# Patient Record
Sex: FEMALE | Race: WHITE | NOT HISPANIC OR LATINO | Employment: UNEMPLOYED | URBAN - METROPOLITAN AREA
[De-identification: names, ages, dates, MRNs, and addresses within clinical notes are randomized per-mention and may not be internally consistent; named-entity substitution may affect disease eponyms.]

---

## 2019-08-16 ENCOUNTER — HOSPITAL PATHOLOGY (OUTPATIENT)
Dept: OTHER | Facility: CLINIC | Age: 44
End: 2019-08-16

## 2019-08-19 LAB — COPATH REPORT: NORMAL

## 2021-04-09 ENCOUNTER — HOSPITAL ENCOUNTER (OUTPATIENT)
Dept: ULTRASOUND IMAGING | Facility: CLINIC | Age: 46
Discharge: HOME | End: 2021-04-09
Attending: PHYSICIAN ASSISTANT | Admitting: PHYSICIAN ASSISTANT
Payer: COMMERCIAL

## 2021-04-09 DIAGNOSIS — R74.8 ELEVATED ALKALINE PHOSPHATASE LEVEL: ICD-10-CM

## 2021-04-09 PROCEDURE — 76705 ECHO EXAM OF ABDOMEN: CPT

## 2022-01-26 ENCOUNTER — TRANSFERRED RECORDS (OUTPATIENT)
Dept: HEALTH INFORMATION MANAGEMENT | Facility: CLINIC | Age: 47
End: 2022-01-26

## 2022-02-17 ENCOUNTER — TRANSFERRED RECORDS (OUTPATIENT)
Dept: HEALTH INFORMATION MANAGEMENT | Facility: CLINIC | Age: 47
End: 2022-02-17

## 2022-12-22 ENCOUNTER — TRANSFERRED RECORDS (OUTPATIENT)
Dept: HEALTH INFORMATION MANAGEMENT | Facility: CLINIC | Age: 47
End: 2022-12-22

## 2023-02-22 ENCOUNTER — TRANSFERRED RECORDS (OUTPATIENT)
Dept: HEALTH INFORMATION MANAGEMENT | Facility: CLINIC | Age: 48
End: 2023-02-22

## 2024-01-19 ENCOUNTER — TRANSFERRED RECORDS (OUTPATIENT)
Dept: HEALTH INFORMATION MANAGEMENT | Facility: CLINIC | Age: 49
End: 2024-01-19

## 2024-02-09 ENCOUNTER — TRANSFERRED RECORDS (OUTPATIENT)
Dept: HEALTH INFORMATION MANAGEMENT | Facility: CLINIC | Age: 49
End: 2024-02-09
Payer: COMMERCIAL

## 2024-02-22 ENCOUNTER — MEDICAL CORRESPONDENCE (OUTPATIENT)
Dept: HEALTH INFORMATION MANAGEMENT | Facility: CLINIC | Age: 49
End: 2024-02-22
Payer: COMMERCIAL

## 2024-02-22 ENCOUNTER — TRANSCRIBE ORDERS (OUTPATIENT)
Dept: OTHER | Age: 49
End: 2024-02-22

## 2024-02-22 DIAGNOSIS — H90.5 SENSORINEURAL HEARING LOSS (SNHL) OF LEFT EAR: Primary | ICD-10-CM

## 2024-02-23 NOTE — TELEPHONE ENCOUNTER
FUTURE VISIT INFORMATION      FUTURE VISIT INFORMATION:  Date: 5/15/24  Time: 10 AM  [place] [facility] [place]  REFERRAL INFORMATION:  Referring provider: Tala Dalal PA-C     Referring providers clinic:  [facility]   Reason for visit/diagnosis:  Sensorineural hearing loss (SNHL) of left ear [H90.5]  Hearing loss, left side; slightly progressive from 5353-7806 + again 2017-218. Possible L. sided meniere's. After checkign potassium levels, planned to prescribe Dyazide. PT unable to take due to anaphylactic reaction to sulfa antibodies - refer for alt  ref by Tala Dalal PA-C affiliated with [facility] clinic at [place]  recs in [facility]  conf loc  sched w/pt     RECORDS REQUESTED FROM      Clinic name Comments Records Status Imaging Status   [facility]  2/9/24 referral/ notes- Tala Dalal PA-C     *request additional recs on 2/23/24  *received notes + audio on 2/28/24 2014- 2023 OV notes  2/9/24, [id number] Audiograms    1/26/22 CT maxillofacial  [employer]:  [id number] Scanned in [facility] Pending  Req 3/4/24    Disc received 3/13/24           February 23, 2024 11:22 AM - request for additional recs from [facility]  February 28, 2024 7:03 AM - received recs from [facility] and send to scanning -[facility]  March 4, 2024 7:49 AM - request for imaging disc -1/26/22 CT maxillofacial -[facility]  March 11, 2024 9:23 AM - resend 2nd request for disc -[facility]    Imaging Received  March 13, 2024 3:25 PM Amelia   Action: Imaging disc from [facility] received and taken to [facility] for upload.

## 2024-05-08 DIAGNOSIS — H90.3 ASYMMETRIC SNHL (SENSORINEURAL HEARING LOSS): Primary | ICD-10-CM

## 2024-05-09 ENCOUNTER — TELEPHONE (OUTPATIENT)
Dept: OTOLARYNGOLOGY | Facility: CLINIC | Age: 49
End: 2024-05-09
Payer: COMMERCIAL

## 2024-05-09 NOTE — TELEPHONE ENCOUNTER
Records Requested May 9, 2024 1:40 PM    Facility  [facility]   Outcome pt said she had an MRI performed at [facility] she believes last year or December of 2022.     Request for imaging report and image pushed to  PACS.    May 10, 2024 6:27 AM - Received 12/22/22 MR brain imaging report. Send to scan. Image resolved in PACS.

## 2024-05-09 NOTE — TELEPHONE ENCOUNTER
LUIZM requesting a call back from pt. Pt needs an MRI prior to consult with Dr. Sloan for a complete evaluation. Two options suggested to pt: 1)postpone consult/audiogram to a day the MRI can also be completed or 2) send the order off to [facility] for pt to obtain externally prior to appointment on 5/15. CB given, [phone].

## 2024-05-10 ENCOUNTER — TELEPHONE (OUTPATIENT)
Dept: OTOLARYNGOLOGY | Facility: CLINIC | Age: 49
End: 2024-05-10
Payer: COMMERCIAL

## 2024-05-10 DIAGNOSIS — H90.3 ASYMMETRIC SNHL (SENSORINEURAL HEARING LOSS): Primary | ICD-10-CM

## 2024-05-14 NOTE — PROGRESS NOTES
"Mary Campuzano is seen in consultation from Tala Dalal PA-C.  She is a 48 year old female being seen for sensorineural hearing loss (SNHL) of left ear. She has a long-standing history of left ear fluctuating hearing loss, with only a little bit of vertigo symptoms. She participated in vestibular therapy and has been seeing neurology for these symptoms since 2016/2017. She describes having headaches during this time, though it is better than earlier as she is on medication. Fluctuating hearing loss appear to be correlated to migraines. Doctors tried diuretics, but she is allergic to the sulfa ingredients; so she had to discontinue treatment. She says that she has tinnitus (bilateral) aural pressure, and dizziness. She says at she has been following a restrictive diet and it helped improve her vertigo symptoms.     Past Medical History:   Diagnosis Date    Unspecified hypothyroidism        Family History   Problem Relation Age of Onset    C.A.D. Maternal Grandfather     C.A.D. Paternal Grandfather     Family History Negative Son         2002    Family History Negative Son         2004    Family History Negative Daughter         2007       Social History     Tobacco Use    Smoking status: Never    Smokeless tobacco: Never   Substance Use Topics    Alcohol use: Yes     Comment: occasional    Drug use: No       Patient Supplied Answers to Review of Systems      5/15/2024     9:04 AM   [facility] ROS   Constitutional Weight gain    Problems with sleep   Neurology Dizzy spells    Headache   Ears, Nose, Throat Hearing loss    Ringing/noise in ears    Nasal congestion or drainage    Sore throat    Hoarseness   Allergy/Immunology Allergies or hay fever   Endocrine Heat or cold intolerance   The remainder of the 10 point review of systems is otherwise negative.      BP (!) 127/91   Pulse 69   Temp 97.4  F (36.3  C)   Ht 1.575 m (5' 2\")   Wt 77.1 kg (170 lb)   SpO2 97%   BMI 31.09 kg/m    Physical " examination:  Constitutional:  In no acute distress, appears stated age  Eyes:  Extraocular movements intact, no spontaneous nystagmus  Ears:  Both ears examined with an open-headed otoscope. The canals were lined with healthy skin. The tympanic membranes appeared healthy and intact without retraction, effusion, or inflammation.  Respiratory:  No increased work of breathing, wheezing or stridor  Musculoskeletal:  Good upper extremity strength  Skin:  No rashes on the head and neck  Neurologic:  House Brackman 1/6 bilaterally, ambulating normally  Psychiatric:  Alert, normal affect, answering questions appropriately    Audiogram: independently reviewed and compared to multiple prior outside audiograms. She underwent an audiogram today (05/15/2024). This demonstrates: Right: Normal sloping to mild SNHL rising to normal hearing with 96% speech discrimination. Left: Moderate rising to mild SNHL with 92% speech discrimination. Tymps WNL bilaterally. Reflexes: Present at normal levels in all conditions.     audiogram showed left severe rising to mild/moderate sensorineural hearing loss with 52% speech discrimination.   test showed left normal/mild downsloping to mild/moderate sensorineural hearing loss with 100% speech discrimination.   test showed leftnormal/mild downsloping to mild/moderate sensorineural hearing loss with 100% speech discrimination. This was true across 3 tests in 2020.  2019 similar to .  2019 with left moderate/severe upsloping to moderate sensorineural hearing loss with poorer speech discrimination but the fax is not legible.   showed left normal/mild downsloping to mild/moderate sensorineural hearing loss with 100% thsd.   showed left normal downsloping to mild sensorineural hearing loss.  2016 showed left normal hearing with a slight drop in the higher frequencies to the normal/mild range with 100% speech discrimination.    Imagin MRI brain and MiniCT  sinuses was independently reviewed. MRI showed no enhancing lesions, overall normal. CT showed aerated temporal bones bilaterally with no abnormalities.    Outside records from [facility] were independently reviewed and summarized above.    Assessment and plan:  Mary Campuzano is seen in consultation for sensorineural hearing loss (SNHL) of left ear. Audiogram and imaging was reviewed with the patient. She has symptoms that are suggestive of Meniere's disease. We discussed following a Meniere's diet. We discussed trial of a non-sulfa diuretic. She has a trip planned for [place] for week. She has been advised to be very mindful of her dietary restrictions. She is aware that loosening her dietary restriction does make her symptoms worse. She has agreed to trial spironolactone. She been encouraged to reach out if symptoms worsen or if new concerns arise. All of the patient's questions were answered to her satisfaction. She will continue to follow with MsSusan Mulugeta although she knows to call if she develops worsening vertigo or a sudden drop in hearing again.    Scribe Disclosure:   I, Sheree Carrasquillo, am serving as a scribe; to document services personally performed by Delmi Sloan MD -based on data collection and the provider's statements to me.     Provider Disclosure:  I agree with above History, Review of Systems, Physical exam and Plan.  I have reviewed the content of the documentation and have edited it as needed. I have personally performed the services documented here and the documentation accurately represents those services and the decisions I have made.

## 2024-05-15 ENCOUNTER — PRE VISIT (OUTPATIENT)
Dept: OTOLARYNGOLOGY | Facility: CLINIC | Age: 49
End: 2024-05-15

## 2024-05-15 ENCOUNTER — OFFICE VISIT (OUTPATIENT)
Dept: OTOLARYNGOLOGY | Facility: CLINIC | Age: 49
End: 2024-05-15
Attending: PHYSICIAN ASSISTANT
Payer: COMMERCIAL

## 2024-05-15 ENCOUNTER — OFFICE VISIT (OUTPATIENT)
Dept: AUDIOLOGY | Facility: CLINIC | Age: 49
End: 2024-05-15
Attending: PHYSICIAN ASSISTANT
Payer: COMMERCIAL

## 2024-05-15 VITALS
TEMPERATURE: 97.4 F | BODY MASS INDEX: 31.28 KG/M2 | HEIGHT: 62 IN | DIASTOLIC BLOOD PRESSURE: 91 MMHG | OXYGEN SATURATION: 97 % | WEIGHT: 170 LBS | HEART RATE: 69 BPM | SYSTOLIC BLOOD PRESSURE: 127 MMHG

## 2024-05-15 DIAGNOSIS — H90.3 ASYMMETRIC SNHL (SENSORINEURAL HEARING LOSS): ICD-10-CM

## 2024-05-15 DIAGNOSIS — H81.02 MENIERE'S DISEASE, LEFT: Primary | ICD-10-CM

## 2024-05-15 DIAGNOSIS — H90.A22 SENSORINEURAL HEARING LOSS (SNHL) OF LEFT EAR WITH RESTRICTED HEARING OF RIGHT EAR: ICD-10-CM

## 2024-05-15 PROCEDURE — 92565 STENGER TEST PURE TONE: CPT | Performed by: AUDIOLOGIST

## 2024-05-15 PROCEDURE — 99204 OFFICE O/P NEW MOD 45 MIN: CPT | Performed by: OTOLARYNGOLOGY

## 2024-05-15 PROCEDURE — 92550 TYMPANOMETRY & REFLEX THRESH: CPT | Performed by: AUDIOLOGIST

## 2024-05-15 PROCEDURE — 92557 COMPREHENSIVE HEARING TEST: CPT | Performed by: AUDIOLOGIST

## 2024-05-15 RX ORDER — PROGESTERONE 100 MG/1
1 CAPSULE ORAL DAILY
COMMUNITY
Start: 2023-08-10 | End: 2024-08-09

## 2024-05-15 RX ORDER — SPIRONOLACTONE 25 MG/1
25 TABLET ORAL DAILY
Qty: 90 TABLET | Refills: 3 | Status: SHIPPED | OUTPATIENT
Start: 2024-05-15

## 2024-05-15 RX ORDER — TOPIRAMATE 25 MG/1
TABLET, FILM COATED ORAL
COMMUNITY
Start: 2023-01-25

## 2024-05-15 RX ORDER — SPIRONOLACTONE 25 MG/1
25 TABLET ORAL DAILY
Qty: 30 TABLET | Refills: 11 | Status: SHIPPED | OUTPATIENT
Start: 2024-05-15 | End: 2024-05-15

## 2024-05-15 ASSESSMENT — PAIN SCALES - GENERAL: PAINLEVEL: NO PAIN (0)

## 2024-05-15 NOTE — PATIENT INSTRUCTIONS
You were seen in the ENT Clinic today by Dr. Sloan. If you have any questions or concerns after your appointment, please contact us (see below)    The following has been recommended for you based upon your appointment today:  Sign release for [facility]  Sending prescription  to the pharmacy    Please return to clinic as needed    How to Contact Us:  Send a [employer] message to your provider. Our team will respond to you via [employer]. Occasionally, we will need to call you to get further information.  For urgent matters (Monday-Friday), call the ENT Clinic: [phone] and speak with a call center team member - they will route your call appropriately.   If you'd like to speak directly with a nurse, please find our contact information below. We do our best to check voicemail frequently throughout the day, and will work to call you back within 1-2 days. For urgent matters, please use the general clinic phone numbers listed above.    Kayley ROGERS, RN, BSN  RN Care Coordinator, ENT Clinic  [facility]  Direct: [phone]  Quyen MILTON LPN  Direct: [phone]

## 2024-05-15 NOTE — LETTER
5/15/2024       RE: Mary Campuzano  [address]  [place] [address]     Dear Colleague,    Thank you for referring your patient, Mary Campuzano, to the [facility] [place] [facility]. Please see a copy of my visit note below.    Mary Campuzano is seen in consultation from Tala Dalal PA-C.  She is a 48 year old female being seen for sensorineural hearing loss (SNHL) of left ear. She has a long-standing history of left ear fluctuating hearing loss, with only a little bit of vertigo symptoms. She participated in vestibular therapy and has been seeing neurology for these symptoms since 2016/2017. She describes having headaches during this time, though it is better than earlier as she is on medication. Fluctuating hearing loss appear to be correlated to migraines. Doctors tried diuretics, but she is allergic to the sulfa ingredients; so she had to discontinue treatment. She says that she has tinnitus (bilateral) aural pressure, and dizziness. She says at she has been following a restrictive diet and it helped improve her vertigo symptoms.     Past Medical History:   Diagnosis Date     Unspecified hypothyroidism        Family History   Problem Relation Age of Onset     C.A.D. Maternal Grandfather      C.A.D. Paternal Grandfather      Family History Negative Son         2002     Family History Negative Son         2004     Family History Negative Daughter         2007       Social History     Tobacco Use     Smoking status: Never     Smokeless tobacco: Never   Substance Use Topics     Alcohol use: Yes     Comment: occasional     Drug use: No       Patient Supplied Answers to Review of Systems      5/15/2024     9:04 AM    ENT ROS   Constitutional Weight gain    Problems with sleep   Neurology Dizzy spells    Headache   Ears, Nose, Throat Hearing loss    Ringing/noise in ears    Nasal congestion or drainage    Sore  "throat    Hoarseness   Allergy/Immunology Allergies or hay fever   Endocrine Heat or cold intolerance   The remainder of the 10 point review of systems is otherwise negative.      BP (!) 127/91   Pulse 69   Temp 97.4  F (36.3  C)   Ht 1.575 m (5' 2\")   Wt 77.1 kg (170 lb)   SpO2 97%   BMI 31.09 kg/m    Physical examination:  Constitutional:  In no acute distress, appears stated age  Eyes:  Extraocular movements intact, no spontaneous nystagmus  Ears:  Both ears examined with an open-headed otoscope. The canals were lined with healthy skin. The tympanic membranes appeared healthy and intact without retraction, effusion, or inflammation.  Respiratory:  No increased work of breathing, wheezing or stridor  Musculoskeletal:  Good upper extremity strength  Skin:  No rashes on the head and neck  Neurologic:  House Brackman 1/6 bilaterally, ambulating normally  Psychiatric:  Alert, normal affect, answering questions appropriately    Audiogram: independently reviewed and compared to multiple prior outside audiograms. She underwent an audiogram today (05/15/2024). This demonstrates: Right: Normal sloping to mild SNHL rising to normal hearing with 96% speech discrimination. Left: Moderate rising to mild SNHL with 92% speech discrimination. Tymps WNL bilaterally. Reflexes: Present at normal levels in all conditions.    2022 audiogram showed left severe rising to mild/moderate sensorineural hearing loss with 52% speech discrimination.  2021 test showed left normal/mild downsloping to mild/moderate sensorineural hearing loss with 100% speech discrimination.  2020 test showed leftnormal/mild downsloping to mild/moderate sensorineural hearing loss with 100% speech discrimination. This was true across 3 tests in 2020.  June 27, 2019 similar to 2020.  June 6, 2019 with left moderate/severe upsloping to moderate sensorineural hearing loss with poorer speech discrimination but the fax is not legible.  2018 showed left normal/mild " downsloping to mild/moderate sensorineural hearing loss with 100% thsd.  2017 showed left normal downsloping to mild sensorineural hearing loss.  2016 showed left normal hearing with a slight drop in the higher frequencies to the normal/mild range with 100% speech discrimination.    Imagin MRI brain and MiniCT sinuses was independently reviewed. MRI showed no enhancing lesions, overall normal. CT showed aerated temporal bones bilaterally with no abnormalities.    Outside records from [facility] were independently reviewed and summarized above.    Assessment and plan:  Mary Campuzano is seen in consultation for sensorineural hearing loss (SNHL) of left ear. Audiogram and imaging was reviewed with the patient. She has symptoms that are suggestive of Meniere's disease. We discussed following a Meniere's diet. We discussed trial of a non-sulfa diuretic. She has a trip planned for [place] for week. She has been advised to be very mindful of her dietary restrictions. She is aware that loosening her dietary restriction does make her symptoms worse. She has agreed to trial spironolactone. She been encouraged to reach out if symptoms worsen or if new concerns arise. All of the patient's questions were answered to her satisfaction. She will continue to follow with MsSusan Dalal although she knows to call if she develops worsening vertigo or a sudden drop in hearing again.    Scribe Disclosure:   I, Sheree Carrasquillo, am serving as a scribe; to document services personally performed by Delmi Sloan MD -based on data collection and the provider's statements to me.     Provider Disclosure:  I agree with above History, Review of Systems, Physical exam and Plan.  I have reviewed the content of the documentation and have edited it as needed. I have personally performed the services documented here and the documentation accurately represents those services and the decisions I have made.        Again, thank  you for allowing me to participate in the care of your patient.      Sincerely,    Delmi Sloan MD

## 2024-05-15 NOTE — NURSING NOTE
Chief Complaint   Patient presents with    Consult     Sensorineural hearing loss left ear        Esau Hernandez LPN

## 2024-05-15 NOTE — PROGRESS NOTES
AUDIOLOGY REPORT    SUMMARY: Audiology visit completed. See audiogram for results.      RECOMMENDATIONS: Follow-up with ENT.    Higinio Bower.  Licensed Audiologist  [place] # [id number]

## 2024-05-15 NOTE — TELEPHONE ENCOUNTER
Records Requested May 15, 2024 11:10 AM    Facility  [facility]  F: [phone]   Outcome Received a signed release for [facility] from the nurse. Faxed request for records. ELISSA to scanning.    May 24, 2024 12:58 PM - 2nd request for records sent. Fax number verify correct by facility. -[facility]    May 28, 2024 6:47 AM - Received recs and send to scanning. Emailed nurse records and also placed in provider's rightfax folder.

## 2024-05-24 ENCOUNTER — TRANSFERRED RECORDS (OUTPATIENT)
Dept: HEALTH INFORMATION MANAGEMENT | Facility: CLINIC | Age: 49
End: 2024-05-24
Payer: COMMERCIAL

## 2024-11-21 DIAGNOSIS — H90.A22 SENSORINEURAL HEARING LOSS (SNHL) OF LEFT EAR WITH RESTRICTED HEARING OF RIGHT EAR: Primary | ICD-10-CM

## 2024-11-25 ENCOUNTER — OFFICE VISIT (OUTPATIENT)
Dept: AUDIOLOGY | Facility: CLINIC | Age: 49
End: 2024-11-25
Payer: COMMERCIAL

## 2024-11-25 ENCOUNTER — OFFICE VISIT (OUTPATIENT)
Dept: OTOLARYNGOLOGY | Facility: CLINIC | Age: 49
End: 2024-11-25
Payer: COMMERCIAL

## 2024-11-25 VITALS
HEIGHT: 62 IN | DIASTOLIC BLOOD PRESSURE: 92 MMHG | WEIGHT: 168 LBS | BODY MASS INDEX: 30.91 KG/M2 | OXYGEN SATURATION: 98 % | HEART RATE: 69 BPM | SYSTOLIC BLOOD PRESSURE: 138 MMHG

## 2024-11-25 DIAGNOSIS — H90.A22 SENSORINEURAL HEARING LOSS (SNHL) OF LEFT EAR WITH RESTRICTED HEARING OF RIGHT EAR: ICD-10-CM

## 2024-11-25 DIAGNOSIS — H90.3 ASYMMETRIC SNHL (SENSORINEURAL HEARING LOSS): Primary | ICD-10-CM

## 2024-11-25 DIAGNOSIS — H81.02 MENIERE'S DISEASE, LEFT: Primary | ICD-10-CM

## 2024-11-25 DIAGNOSIS — H93.13 TINNITUS OF BOTH EARS: ICD-10-CM

## 2024-11-25 DIAGNOSIS — R42 DIZZINESS AND GIDDINESS: ICD-10-CM

## 2024-11-25 PROCEDURE — 99212 OFFICE O/P EST SF 10 MIN: CPT | Mod: GC | Performed by: OTOLARYNGOLOGY

## 2024-11-25 ASSESSMENT — PAIN SCALES - GENERAL: PAINLEVEL_OUTOF10: MILD PAIN (3)

## 2024-11-25 NOTE — PATIENT INSTRUCTIONS
You were seen in the ENT Clinic today by Dr. Sloan. If you have any questions or concerns after your appointment, please contact us (see below)      Please return to clinic as needed    How to Contact Us:  Send a [employer] message to your provider. Our team will respond to you via [employer]. Occasionally, we will need to call you to get further information.  For urgent matters (Monday-Friday), call the ENT Clinic: [phone] and speak with a call center team member - they will route your call appropriately.   If you'd like to speak directly with a nurse, please find our contact information below. We do our best to check voicemail frequently throughout the day, and will work to call you back within 1-2 days. For urgent matters, please use the general clinic phone numbers listed above.    Kayley ROGERS, RN, BSN  RN Care Coordinator, ENT Clinic  [facility]  Direct: [phone]  Quyen MILTON LPN  Direct: [phone]

## 2024-11-25 NOTE — PROGRESS NOTES
AUDIOLOGY REPORT    SUMMARY: Audiology visit completed. See audiogram for results.      RECOMMENDATIONS: Follow-up with ENT.        Corie Iniguez  Audiologist  [place] License  #[id number]

## 2024-11-25 NOTE — LETTER
11/25/2024      RE: Mary Campuzano  [address]  [place] [address]         [facility] [address]  [address]LO[address]  [facility] [address]  Phone: [phone]  Fax: [phone]    Patient:  Mary Campuzano, Date of birth 1975  Date of Visit:  11/25/2024    Assessment & Plan     Mary Campuzano is seen in follow up for migraine and Meniere's disease. She has been doing pretty well since her last visit. She tried the spironolactone and didn't feel well while taking it but has fortunately been doing well with diet, exercise, and consistent sleep. She will keep up with these practices. She may follow up as needed. We reviewed concerning signs and symptoms including significant hearing loss that lasts for a week or more, worsening of or recurrent vertigo episodes. She should resume Spironolactone if this occurs and we advised her on the importance of hydration if that occurs. She asked appropriate questions and is happy with the plan.     HPI  Mary Campuzano is seen in follow up for possible left Meniere's disease. Today, she reports that she has been doing pretty well since her last visit in May. She tried the spironolactone for 2 weeks and didn't feel great on it so she stopped taking it. She has not had vertigo symptoms and had minor hearing fluctuation but overall feels well. She has been controlled with diet, exercise, and consistent sleep.   She did note that she traveled to [place] over the summer and went off of her diet but felt off for the subsequent month and says she will never do that again.     Physical examination:  female in no acute distress.  Alert and answering questions appropriately.  HB 1/6 bilaterally.  Both ears examined with the otoscope. EACs clear, TMs intact, middle ears aerated.     Audiogram:  Audiogram was independently reviewed and compared to her prior test. She has stable right hearing and improvement in the left low  frequencies. Excellent word recognition scores bilaterally.   Left has improved slightly in the low frequencies, stable speech discrimination.        Scribe Disclosure:   I, Flor Bolden, am serving as a scribe; to document services personally performed by Delmi Sloan MD -based on data collection and the provider's statements to me.     Provider Disclosure:  I agree with above History, Review of Systems, Physical exam and Plan.  I have reviewed the content of the documentation and have edited it as needed. I have personally performed the services documented here and the documentation accurately represents those services and the decisions I have made.      Sarah Mays MD  Fellow Physician  Otology & Neurotology  [facility]      I, Delmi Sloan MD, saw this patient with the resident/fellow and agree with the resident/fellow s findings and plan of care as documented in the resident s/fellow s note. I was present for the entire procedure.      Delmi Sloan MD

## 2024-11-25 NOTE — PROGRESS NOTES
[facility] EAR NOSE AND THROAT CLINIC [address] [address]  Phone: [phone]  Fax: [phone]    Patient:  Mary Campuzano, Date of birth 1975  Date of Visit:  11/25/2024    Assessment & Plan      Mary Campuzano is seen in follow up for migraine and Meniere's disease. She has been doing pretty well since her last visit. She tried the spironolactone and didn't feel well while taking it but has fortunately been doing well with diet, exercise, and consistent sleep. She will keep up with these practices. She may follow up as needed. We reviewed concerning signs and symptoms including significant hearing loss that lasts for a few days, worsening of or recurrent vertigo episodes. She would resume Spironolactone if this occurs and we advised her on the importance of hydration if that occurs. She asked appropriate questions and is happy with the plan.     HPI  Last visit date 5/15/24 and symptoms at that time: Mary Campuzano is seen in consultation from Tala Dalal PA-C.  She is a 48 year old female being seen for sensorineural hearing loss (SNHL) of left ear. She has a long-standing history of left ear fluctuating hearing loss, with only a little bit of vertigo symptoms. She participated in vestibular therapy and has been seeing neurology for these symptoms since 2016/2017. She describes having headaches during this time, though it is better than earlier as she is on medication. Fluctuating hearing loss appear to be correlated to migraines. Doctors tried diuretics, but she is allergic to the sulfa ingredients; so she had to discontinue treatment. She says that she has tinnitus (bilateral) aural pressure, and dizziness. She says at she has been following a restrictive diet and it helped improve her vertigo symptoms.     Today, she reports that she has been doing pretty well since her last visit. She tried the spironolactone for 2 weeks and  didn't feel great on it so she stopped taking it. She has not had vertigo symptoms and had minor hearing fluctuation but overall feels well. She has been controlled with diet, exercise, and consistent sleep.   She did note that she traveled to [place] over the summer and went off of her diet but felt off for the subsequent month and says she will never do that again.     Physical examination:  female in no acute distress.  Alert and answering questions appropriately.  HB 1/6 bilaterally.  Both ears examined under the otoscope. EACs clear, TMs intact, middle ears aerated.     Audiogram:  Audiogram was independently reviewed. She has stable right hearing and improvement in the left low frequencies. Excellent word recognition scores bilaterally.       Scribe Disclosure:   I, Flor Bolden, am serving as a scribe; to document services personally performed by Delmi Sloan MD -based on data collection and the provider's statements to me.     Provider Disclosure:  I agree with above History, Review of Systems, Physical exam and Plan.  I have reviewed the content of the documentation and have edited it as needed. I have personally performed the services documented here and the documentation accurately represents those services and the decisions I have made.      Electronically signed by:  Sarah Mays MD  Fellow Physician  Otology & Neurotology  [facility]

## 2024-11-25 NOTE — NURSING NOTE
"Chief Complaint   Patient presents with    RECHECK     6 month follow up     \Blood pressure (!) 156/98, pulse 69, height 1.575 m (5' 2\"), weight 76.2 kg (168 lb), SpO2 98%.    Esau Hernandez LPN    "

## 2025-06-19 DIAGNOSIS — H81.02 MENIERE'S DISEASE, LEFT: ICD-10-CM

## 2025-06-23 RX ORDER — SPIRONOLACTONE 25 MG/1
25 TABLET ORAL DAILY
Qty: 90 TABLET | Refills: 3 | Status: SHIPPED | OUTPATIENT
Start: 2025-06-23

## 2025-06-23 NOTE — TELEPHONE ENCOUNTER
spironolactone (ALDACTONE) 25 MG tablet   Start: 05/15/2024   Disp 90   R  3  Take 1 tablet (25 mg) by mouth daily     Delmi Sloan MD  Otolaryngology   11/25/24  Nv  none      Refill decision: Medication unable to be refilled by RN due to: Other: [facility] dx. K+.gfr        Request from pharmacy:  Requested Prescriptions   Pending Prescriptions Disp Refills    spironolactone (ALDACTONE) 25 MG tablet [Pharmacy Med Name: SPIRONOLACTONE 25MG TABLETS] 90 tablet 3     Sig: TAKE 1 TABLET(25 MG) BY MOUTH DAILY       Diuretics (Including Combos) Protocol Failed - 6/23/2025 12:51 PM        Failed - Most recent blood pressure under 140/90 in past 12 months     BP Readings from Last 3 Encounters:   11/25/24 (!) 138/92   05/15/24 (!) 127/91   03/27/09 108/74       No data recorded            Failed - Potassium level on file in past 12 months        Failed - Has GFR on file in past 12 months and most recent value is normal        Failed - Medication indicated for associated diagnosis             Passed - Medication is active on med list and the sig matches. RN to manually verify dose and sig if red X/fail.     If the protocol passes (green check), you do not need to verify med dose and sig.    A prescription matches if they are the same clinical intention.    For Example: once daily and every morning are the same.    The protocol can not identify upper and lower case letters as matching and will fail.     For Example: Take 1 tablet (50 mg) by mouth daily     TAKE 1 TABLET (50 MG) BY MOUTH DAILY    For all fails Medication is associated with one or more of the following diagnoses:     Hypertension   Heart Failure   Hyperaldosteronism   Acne   Hirsutism   Hypokalemia   Hepatic Cirrhosis   Nephrotic Syndrome   Myocardial Infarction   Transgender Female   Cardiomyopathy  Congenital Heart Disease  Diastolic Dysfunction(red x), verify dose and sig.    If the refill does match what is on file, the RN can still proceed to approve the  refill request.       If they do not match, route to the appropriate provider.             Passed - Recent (12 month) or future (90 days) visit with authorizing provider's specialty (provided they have been seen in the past 15 months)     The patient must have completed an in-person or virtual visit within the past 12 months or has a future visit scheduled within the next 90 days with the authorizing provider s specialty.  Urgent care and e-visits do not qualify as an office visit for this protocol.          Passed - Patient is age 18 or older        Passed - No active pregancy on record        Passed - No positive pregnancy test in past 12 months